# Patient Record
Sex: FEMALE | Race: WHITE | NOT HISPANIC OR LATINO | Employment: OTHER | URBAN - NONMETROPOLITAN AREA
[De-identification: names, ages, dates, MRNs, and addresses within clinical notes are randomized per-mention and may not be internally consistent; named-entity substitution may affect disease eponyms.]

---

## 2021-01-22 ENCOUNTER — LAB (OUTPATIENT)
Dept: LAB | Facility: HOSPITAL | Age: 46
End: 2021-01-22

## 2021-01-22 ENCOUNTER — OFFICE VISIT (OUTPATIENT)
Dept: FAMILY MEDICINE CLINIC | Facility: CLINIC | Age: 46
End: 2021-01-22

## 2021-01-22 VITALS
DIASTOLIC BLOOD PRESSURE: 88 MMHG | BODY MASS INDEX: 20.75 KG/M2 | SYSTOLIC BLOOD PRESSURE: 122 MMHG | HEIGHT: 72 IN | OXYGEN SATURATION: 96 % | HEART RATE: 83 BPM | WEIGHT: 153.2 LBS

## 2021-01-22 DIAGNOSIS — G89.29 CHRONIC PAIN OF LEFT KNEE: ICD-10-CM

## 2021-01-22 DIAGNOSIS — G89.29 CHRONIC LEFT SHOULDER PAIN: ICD-10-CM

## 2021-01-22 DIAGNOSIS — M25.562 CHRONIC PAIN OF LEFT KNEE: ICD-10-CM

## 2021-01-22 DIAGNOSIS — Z00.00 HEALTHCARE MAINTENANCE: Primary | ICD-10-CM

## 2021-01-22 DIAGNOSIS — I73.00 RAYNAUD'S PHENOMENON WITHOUT GANGRENE: ICD-10-CM

## 2021-01-22 DIAGNOSIS — M25.512 CHRONIC LEFT SHOULDER PAIN: ICD-10-CM

## 2021-01-22 DIAGNOSIS — Z00.00 HEALTHCARE MAINTENANCE: ICD-10-CM

## 2021-01-22 LAB
ALBUMIN SERPL-MCNC: 4.9 G/DL (ref 3.5–5.2)
ALBUMIN/GLOB SERPL: 1.7 G/DL
ALP SERPL-CCNC: 35 U/L (ref 39–117)
ALT SERPL W P-5'-P-CCNC: 16 U/L (ref 1–33)
ANION GAP SERPL CALCULATED.3IONS-SCNC: 14.3 MMOL/L (ref 5–15)
AST SERPL-CCNC: 22 U/L (ref 1–32)
BASOPHILS # BLD AUTO: 0.05 10*3/MM3 (ref 0–0.2)
BASOPHILS NFR BLD AUTO: 1 % (ref 0–1.5)
BILIRUB SERPL-MCNC: 0.6 MG/DL (ref 0–1.2)
BUN SERPL-MCNC: 6 MG/DL (ref 6–20)
BUN/CREAT SERPL: 8.2 (ref 7–25)
CALCIUM SPEC-SCNC: 9.8 MG/DL (ref 8.6–10.5)
CHLORIDE SERPL-SCNC: 100 MMOL/L (ref 98–107)
CHOLEST SERPL-MCNC: 190 MG/DL (ref 0–200)
CHROMATIN AB SERPL-ACNC: <10 IU/ML (ref 0–14)
CO2 SERPL-SCNC: 24.7 MMOL/L (ref 22–29)
CREAT SERPL-MCNC: 0.73 MG/DL (ref 0.57–1)
CRP SERPL-MCNC: <0.03 MG/DL (ref 0–0.5)
DEPRECATED RDW RBC AUTO: 44.8 FL (ref 37–54)
EOSINOPHIL # BLD AUTO: 0.07 10*3/MM3 (ref 0–0.4)
EOSINOPHIL NFR BLD AUTO: 1.4 % (ref 0.3–6.2)
ERYTHROCYTE [DISTWIDTH] IN BLOOD BY AUTOMATED COUNT: 15.5 % (ref 12.3–15.4)
ERYTHROCYTE [SEDIMENTATION RATE] IN BLOOD: 3 MM/HR (ref 0–20)
GFR SERPL CREATININE-BSD FRML MDRD: 86 ML/MIN/1.73
GLOBULIN UR ELPH-MCNC: 2.9 GM/DL
GLUCOSE SERPL-MCNC: 92 MG/DL (ref 65–99)
HCT VFR BLD AUTO: 38.5 % (ref 34–46.6)
HDLC SERPL-MCNC: 90 MG/DL (ref 40–60)
HGB BLD-MCNC: 12.1 G/DL (ref 12–15.9)
IMM GRANULOCYTES # BLD AUTO: 0.01 10*3/MM3 (ref 0–0.05)
IMM GRANULOCYTES NFR BLD AUTO: 0.2 % (ref 0–0.5)
LDLC SERPL CALC-MCNC: 90 MG/DL (ref 0–100)
LDLC/HDLC SERPL: 1 {RATIO}
LYMPHOCYTES # BLD AUTO: 1.48 10*3/MM3 (ref 0.7–3.1)
LYMPHOCYTES NFR BLD AUTO: 28.9 % (ref 19.6–45.3)
MCH RBC QN AUTO: 25.6 PG (ref 26.6–33)
MCHC RBC AUTO-ENTMCNC: 31.4 G/DL (ref 31.5–35.7)
MCV RBC AUTO: 81.6 FL (ref 79–97)
MONOCYTES # BLD AUTO: 0.41 10*3/MM3 (ref 0.1–0.9)
MONOCYTES NFR BLD AUTO: 8 % (ref 5–12)
NEUTROPHILS NFR BLD AUTO: 3.1 10*3/MM3 (ref 1.7–7)
NEUTROPHILS NFR BLD AUTO: 60.5 % (ref 42.7–76)
NRBC BLD AUTO-RTO: 0 /100 WBC (ref 0–0.2)
PLATELET # BLD AUTO: 310 10*3/MM3 (ref 140–450)
PMV BLD AUTO: 11.6 FL (ref 6–12)
POTASSIUM SERPL-SCNC: 3.9 MMOL/L (ref 3.5–5.2)
PROT SERPL-MCNC: 7.8 G/DL (ref 6–8.5)
RBC # BLD AUTO: 4.72 10*6/MM3 (ref 3.77–5.28)
SODIUM SERPL-SCNC: 139 MMOL/L (ref 136–145)
TRIGL SERPL-MCNC: 50 MG/DL (ref 0–150)
URATE SERPL-MCNC: 4.5 MG/DL (ref 2.4–5.7)
VLDLC SERPL-MCNC: 10 MG/DL (ref 5–40)
WBC # BLD AUTO: 5.12 10*3/MM3 (ref 3.4–10.8)

## 2021-01-22 PROCEDURE — 86140 C-REACTIVE PROTEIN: CPT

## 2021-01-22 PROCEDURE — 99203 OFFICE O/P NEW LOW 30 MIN: CPT | Performed by: STUDENT IN AN ORGANIZED HEALTH CARE EDUCATION/TRAINING PROGRAM

## 2021-01-22 PROCEDURE — 84550 ASSAY OF BLOOD/URIC ACID: CPT

## 2021-01-22 PROCEDURE — 80053 COMPREHEN METABOLIC PANEL: CPT

## 2021-01-22 PROCEDURE — 36415 COLL VENOUS BLD VENIPUNCTURE: CPT

## 2021-01-22 PROCEDURE — 85652 RBC SED RATE AUTOMATED: CPT

## 2021-01-22 PROCEDURE — 85025 COMPLETE CBC W/AUTO DIFF WBC: CPT

## 2021-01-22 PROCEDURE — 80061 LIPID PANEL: CPT

## 2021-01-22 PROCEDURE — 86431 RHEUMATOID FACTOR QUANT: CPT

## 2021-01-22 RX ORDER — CYCLOBENZAPRINE HCL 10 MG
10 TABLET ORAL DAILY
COMMUNITY
End: 2021-06-21 | Stop reason: SDUPTHER

## 2021-01-22 RX ORDER — ALPRAZOLAM 0.25 MG/1
0.5 TABLET ORAL AS NEEDED
COMMUNITY
End: 2021-06-22

## 2021-01-22 RX ORDER — AMLODIPINE BESYLATE 2.5 MG/1
2.5 TABLET ORAL AS NEEDED
COMMUNITY
End: 2021-12-03 | Stop reason: SDUPTHER

## 2021-01-22 RX ORDER — CELECOXIB 200 MG/1
200 CAPSULE ORAL DAILY
COMMUNITY
End: 2021-06-21 | Stop reason: SDUPTHER

## 2021-01-22 RX ORDER — FLUTICASONE PROPIONATE 50 MCG
SPRAY, SUSPENSION (ML) NASAL
COMMUNITY
End: 2021-06-21 | Stop reason: SDUPTHER

## 2021-01-22 RX ORDER — OLOPATADINE HYDROCHLORIDE 1 MG/ML
SOLUTION/ DROPS OPHTHALMIC
COMMUNITY
End: 2021-12-03 | Stop reason: SDUPTHER

## 2021-01-22 RX ORDER — ZOLPIDEM TARTRATE 5 MG/1
5 TABLET ORAL DAILY
COMMUNITY
Start: 2013-09-02 | End: 2021-06-22

## 2021-01-22 RX ORDER — SODIUM FLUORIDE 5 MG/G
GEL, DENTIFRICE DENTAL
COMMUNITY

## 2021-01-22 RX ORDER — ACETAMINOPHEN 325 MG/1
325 TABLET ORAL AS NEEDED
COMMUNITY

## 2021-03-09 ENCOUNTER — LAB (OUTPATIENT)
Dept: LAB | Facility: HOSPITAL | Age: 46
End: 2021-03-09

## 2021-03-09 ENCOUNTER — OFFICE VISIT (OUTPATIENT)
Dept: ORTHOPEDIC SURGERY | Facility: CLINIC | Age: 46
End: 2021-03-09

## 2021-03-09 VITALS — WEIGHT: 154 LBS | BODY MASS INDEX: 20.86 KG/M2 | HEIGHT: 72 IN

## 2021-03-09 DIAGNOSIS — M25.562 ACUTE PAIN OF LEFT KNEE: ICD-10-CM

## 2021-03-09 DIAGNOSIS — I10 ESSENTIAL HYPERTENSION: ICD-10-CM

## 2021-03-09 DIAGNOSIS — G89.29 CHRONIC LEFT SHOULDER PAIN: ICD-10-CM

## 2021-03-09 DIAGNOSIS — M25.312 INSTABILITY OF LEFT SHOULDER JOINT: Primary | ICD-10-CM

## 2021-03-09 DIAGNOSIS — M25.312 INSTABILITY OF LEFT SHOULDER JOINT: ICD-10-CM

## 2021-03-09 DIAGNOSIS — M25.512 CHRONIC LEFT SHOULDER PAIN: ICD-10-CM

## 2021-03-09 LAB — CRP SERPL-MCNC: <0.3 MG/DL (ref 0–0.5)

## 2021-03-09 PROCEDURE — 86140 C-REACTIVE PROTEIN: CPT

## 2021-03-09 PROCEDURE — 36415 COLL VENOUS BLD VENIPUNCTURE: CPT

## 2021-03-09 PROCEDURE — 85652 RBC SED RATE AUTOMATED: CPT

## 2021-03-09 PROCEDURE — 85025 COMPLETE CBC W/AUTO DIFF WBC: CPT

## 2021-03-09 PROCEDURE — 99204 OFFICE O/P NEW MOD 45 MIN: CPT | Performed by: ORTHOPAEDIC SURGERY

## 2021-03-09 NOTE — PROGRESS NOTES
Nancy Rivera is a 45 y.o. female   Primary provider:  Luis Stevenson MD       Chief Complaint   Patient presents with   • Left Shoulder - Initial Evaluation   • Left Knee - Initial Evaluation       HISTORY OF PRESENT ILLNESS:  Patient in for initial eval of left shoulder and left knee pain  Pain- 1/10  Xray on 01/22/2021  Patient states that she was an [employer].  She has had 3 prior surgeries involving her left shoulder.  She states that she initially had surgery in 2015 after a shoulder dislocation involving a SLAP tear.  She then had surgery in 2020 and then again in in November 2020 in which a biceps tenodesis screw was removed with the last surgery.  Her records are not currently available for review.  She reports that she is having left shoulder pain consisting of pain at night that wakes her up.  She has pain with activity and the pain is worse with activity.  She has numbness and tingling goes down her left arm.  She does take Celebrex for pain relief.  Her last MRI was May 2020.  She states that she feels a shift in her shoulder with certain activity.  She has been doing home exercise program.    She reports that her knee has no specific injury.  She states that she had a questionable bone bruise and a bone spur identified on MRI.  She has had Orthovisc injection but no recent MRI that she can recall.  She states that she tried physical therapy on her knee but that made her pain worse.        Knee Pain   Incident onset: 7 years. There was no injury mechanism. The pain is present in the left knee (and left shoulder). The quality of the pain is described as aching and stabbing. The pain is moderate. The pain has been constant since onset. Associated symptoms include numbness. Associated symptoms comments: Clicking/popping/snapping and swelling. She reports no foreign bodies present. Exacerbated by: standing, driving and walking. She has tried heat, ice and NSAIDs for the symptoms.        CONCURRENT  "MEDICAL HISTORY:    Past Medical History:   Diagnosis Date   • Allergic    • Anemia    • Anxiety    • Back pain        Allergies   Allergen Reactions   • Codeine Unknown - Low Severity         Current Outpatient Medications:   •  acetaminophen (Tylenol) 325 MG tablet, Take 325 mg by mouth As Needed., Disp: , Rfl:   •  ALPRAZolam (Xanax) 0.25 MG tablet, Take 0.5 mg by mouth As Needed., Disp: , Rfl:   •  amLODIPine (NORVASC) 2.5 MG tablet, Take 2.5 mg by mouth As Needed., Disp: , Rfl:   •  celecoxib (CeleBREX) 200 MG capsule, Take 200 mg by mouth Daily., Disp: , Rfl:   •  cyclobenzaprine (FLEXERIL) 10 MG tablet, Take 10 mg by mouth Daily., Disp: , Rfl:   •  fluticasone (Flonase Allergy Relief) 50 MCG/ACT nasal spray, , Disp: , Rfl:   •  olopatadine (PATANOL) 0.1 % ophthalmic solution, olopatadine 0.1 % eye drops, Disp: , Rfl:   •  Sodium Fluoride (PreviDent) 1.1 % gel, PreviDent 5000 Booster Plus 1.1 % dental paste, Disp: , Rfl:   •  triamcinolone (KENALOG) 0.1 % ointment, triamcinolone acetonide 0.1 % topical ointment, Disp: , Rfl:   •  zolpidem (Ambien) 5 MG tablet, Take 5 mg by mouth Daily., Disp: , Rfl:     Past Surgical History:   Procedure Laterality Date   • BICEPS TENODESIS Left    • SHOULDER ACROMIOPLASTY Right        No family history on file.    Social History     Socioeconomic History   • Marital status:      Spouse name: Not on file   • Number of children: Not on file   • Years of education: Not on file   • Highest education level: Not on file   Tobacco Use   • Smoking status: Current Every Day Smoker   • Smokeless tobacco: Never Used   Substance and Sexual Activity   • Alcohol use: Never   • Drug use: Never   • Sexual activity: Defer        Review of Systems   HENT: Positive for ear pain.    Eyes: Positive for visual disturbance.   Neurological: Positive for numbness and headaches.   All other systems reviewed and are negative.      PHYSICAL EXAMINATION:       Ht 182.9 cm (72\")   Wt 69.9 kg (154 " lb)   BMI 20.89 kg/m²     Physical Exam  Constitutional:       General: She is not in acute distress.     Appearance: Normal appearance.   Pulmonary:      Effort: Pulmonary effort is normal. No respiratory distress.   Neurological:      Mental Status: She is alert and oriented to person, place, and time.         GAIT:     []  Normal  []  Antalgic    Assistive device: [x]  None  []  Walker     []  Crutches  []  Cane     []  Wheelchair  []  Stretcher    Left Shoulder Exam     Tenderness   The patient is experiencing tenderness in the acromioclavicular joint and acromion.    Range of Motion   Active abduction: 120   External rotation: 90   Forward flexion: 130   Internal rotation 90 degrees: 50     Muscle Strength   Abduction: 4/5   Supraspinatus: 4/5     Other   Erythema: absent  Sensation: normal  Pulse: present     Comments:  Negative Rhoades Maurice test.  Negative sulcus sign.  Negative's load shift test.  Negative speeds test.  She has tenderness with apprehension test.                 Three view left shoulder     HISTORY: Left shoulder pain. Chronic pain.     AP films with the humerus in internal and external rotation and  scapular Y view were obtained.     COMPARISON: None     FINDINGS:   Minimal widening acromioclavicular joint.  No fracture or dislocation.  No other osseous or articular abnormality.     IMPRESSION:  CONCLUSION:  Minimal widening acromioclavicular joint.     [id number]     Electronically signed by:  Roderick Pro MD  1/22/2021 12:00 PM  CST Workstation: [id number]    Narrative & Impression      Four view left knee     HISTORY: Chronic left knee pain     AP, lateral, tunnel and oblique views obtained.     COMPARISON: None     FINDINGS:   No fracture or dislocation.  Incidental 21 mm x in length 4 mm x 3 mm exostosis arising  ventrally from the fibular head.  No suprapatellar effusion.  No other osseous or articular abnormality.     IMPRESSION:  CONCLUSION:  Incidental 21 mm in length x 4 mm x 3 mm  exostosis arising  ventrally from the fibular head.     [id number]     Electronically signed by:  Roderick Pro MD  1/22/2021 5:39 PM CST  Workstation: [id number]           ASSESSMENT:    Diagnoses and all orders for this visit:    Instability of left shoulder joint  -     CBC & Differential; Future  -     C-reactive Protein; Future  -     Sedimentation Rate; Future  -     MRI Shoulder Left Arthrogram; Future  -     FL Contrast Injection CT / MRI; Future    Acute pain of left knee  -     CBC & Differential; Future  -     C-reactive Protein; Future  -     Sedimentation Rate; Future    Chronic left shoulder pain  -     CBC & Differential; Future  -     C-reactive Protein; Future  -     Sedimentation Rate; Future  -     MRI Shoulder Left Arthrogram; Future  -     FL Contrast Injection CT / MRI; Future    Essential hypertension          PLAN    Long discussion was had with the patient regarding further treatment options.  Her last MRI was prior to her last surgery.  She continues to have pain in her left shoulder and reports some instability type symptoms although her special testing today is essentially negative.  She has constant dull aches we therefore discussed proceeding with CBC, CRP, and ESR to assess for low-grade infection.  We also discussed proceeding with MR arthrogram to assess for changes in the superior labral repair.    She is going to continue to work on obtaining the records from prior surgeries and prior MRIs for further review.    She has previously been taking Celebrex and will continue with that anti-inflammatory treatment.    Over 45 minutes was spent with the patient in regards to evaluation, history, treatment options, and reviewing the available records/x-rays/reports.    Return for recheck for MRI results.    Harley Walter MD

## 2021-03-10 LAB
BASOPHILS # BLD AUTO: 0.07 10*3/MM3 (ref 0–0.2)
BASOPHILS NFR BLD AUTO: 0.9 % (ref 0–1.5)
DEPRECATED RDW RBC AUTO: 50.6 FL (ref 37–54)
EOSINOPHIL # BLD AUTO: 0.15 10*3/MM3 (ref 0–0.4)
EOSINOPHIL NFR BLD AUTO: 1.9 % (ref 0.3–6.2)
ERYTHROCYTE [DISTWIDTH] IN BLOOD BY AUTOMATED COUNT: 15.7 % (ref 12.3–15.4)
ERYTHROCYTE [SEDIMENTATION RATE] IN BLOOD: 13 MM/HR (ref 0–20)
HCT VFR BLD AUTO: 41.1 % (ref 34–46.6)
HGB BLD-MCNC: 13 G/DL (ref 12–15.9)
IMM GRANULOCYTES # BLD AUTO: 0.02 10*3/MM3 (ref 0–0.05)
IMM GRANULOCYTES NFR BLD AUTO: 0.3 % (ref 0–0.5)
LYMPHOCYTES # BLD AUTO: 1.64 10*3/MM3 (ref 0.7–3.1)
LYMPHOCYTES NFR BLD AUTO: 21.2 % (ref 19.6–45.3)
MCH RBC QN AUTO: 27.6 PG (ref 26.6–33)
MCHC RBC AUTO-ENTMCNC: 31.6 G/DL (ref 31.5–35.7)
MCV RBC AUTO: 87.3 FL (ref 79–97)
MONOCYTES # BLD AUTO: 0.6 10*3/MM3 (ref 0.1–0.9)
MONOCYTES NFR BLD AUTO: 7.8 % (ref 5–12)
NEUTROPHILS NFR BLD AUTO: 5.24 10*3/MM3 (ref 1.7–7)
NEUTROPHILS NFR BLD AUTO: 67.9 % (ref 42.7–76)
NRBC BLD AUTO-RTO: 0 /100 WBC (ref 0–0.2)
PLATELET # BLD AUTO: 291 10*3/MM3 (ref 140–450)
PMV BLD AUTO: 11.3 FL (ref 6–12)
RBC # BLD AUTO: 4.71 10*6/MM3 (ref 3.77–5.28)
WBC # BLD AUTO: 7.72 10*3/MM3 (ref 3.4–10.8)

## 2021-03-22 ENCOUNTER — HOSPITAL ENCOUNTER (OUTPATIENT)
Dept: MRI IMAGING | Facility: HOSPITAL | Age: 46
Discharge: HOME | End: 2021-03-22

## 2021-03-22 ENCOUNTER — HOSPITAL ENCOUNTER (OUTPATIENT)
Dept: GENERAL RADIOLOGY | Facility: HOSPITAL | Age: 46
Discharge: HOME | End: 2021-03-22

## 2021-03-22 VITALS
TEMPERATURE: 98.6 F | SYSTOLIC BLOOD PRESSURE: 142 MMHG | HEART RATE: 88 BPM | OXYGEN SATURATION: 98 % | DIASTOLIC BLOOD PRESSURE: 96 MMHG

## 2021-03-22 DIAGNOSIS — M25.512 CHRONIC LEFT SHOULDER PAIN: ICD-10-CM

## 2021-03-22 DIAGNOSIS — G89.29 CHRONIC LEFT SHOULDER PAIN: ICD-10-CM

## 2021-03-22 DIAGNOSIS — M25.312 INSTABILITY OF LEFT SHOULDER JOINT: ICD-10-CM

## 2021-03-22 PROCEDURE — A9579 GAD-BASE MR CONTRAST NOS,1ML: HCPCS | Performed by: ORTHOPAEDIC SURGERY

## 2021-03-22 PROCEDURE — 77002 NEEDLE LOCALIZATION BY XRAY: CPT

## 2021-03-22 PROCEDURE — 73222 MRI JOINT UPR EXTREM W/DYE: CPT

## 2021-03-22 PROCEDURE — 25010000002 GADOTERIDOL PER 1 ML: Performed by: ORTHOPAEDIC SURGERY

## 2021-03-22 PROCEDURE — 25010000002 IOPAMIDOL 61 % SOLUTION: Performed by: ORTHOPAEDIC SURGERY

## 2021-03-22 RX ADMIN — GADOTERIDOL 1 ML: 279.3 INJECTION, SOLUTION INTRAVENOUS at 14:03

## 2021-03-22 RX ADMIN — IOPAMIDOL 5 ML: 612 INJECTION, SOLUTION INTRAVENOUS at 13:47

## 2021-03-22 NOTE — POST-PROCEDURE NOTE
Pre-Op Diagnosis:  Left shoulder pain  Post-Op Diagnosis: Left shoulder pain  Procedure: Left shoulder arthrogram  Anesthesia: local  EBL: none  Specimens:   Not sent to LAB / PATH  Findings: No Specimen sent, awaiting results  Complications: none  Disposition:  Patient tolerated the procedure well

## 2021-03-22 NOTE — PRE-PROCEDURE NOTE
Patient for left shoulder arthrogram pre-MRI.Procedure, risks , and benefits discussed with patient and patient  gave informed consent.  H&P dated 3/09/2021 reviewed with no changes.

## 2021-06-21 ENCOUNTER — OFFICE VISIT (OUTPATIENT)
Dept: FAMILY MEDICINE CLINIC | Facility: CLINIC | Age: 46
End: 2021-06-21

## 2021-06-21 VITALS
SYSTOLIC BLOOD PRESSURE: 132 MMHG | HEIGHT: 72 IN | WEIGHT: 154.8 LBS | BODY MASS INDEX: 20.97 KG/M2 | OXYGEN SATURATION: 98 % | HEART RATE: 75 BPM | DIASTOLIC BLOOD PRESSURE: 88 MMHG

## 2021-06-21 DIAGNOSIS — M25.572 ACUTE LEFT ANKLE PAIN: ICD-10-CM

## 2021-06-21 DIAGNOSIS — M54.50 ACUTE RIGHT-SIDED LOW BACK PAIN WITHOUT SCIATICA: Primary | ICD-10-CM

## 2021-06-21 DIAGNOSIS — J30.2 SEASONAL ALLERGIES: ICD-10-CM

## 2021-06-21 PROCEDURE — 99213 OFFICE O/P EST LOW 20 MIN: CPT | Performed by: STUDENT IN AN ORGANIZED HEALTH CARE EDUCATION/TRAINING PROGRAM

## 2021-06-21 RX ORDER — FLUTICASONE PROPIONATE 50 MCG
2 SPRAY, SUSPENSION (ML) NASAL DAILY
Qty: 16 G | Refills: 2 | Status: SHIPPED | OUTPATIENT
Start: 2021-06-21 | End: 2021-10-18 | Stop reason: SDUPTHER

## 2021-06-21 RX ORDER — PREDNISONE 20 MG/1
40 TABLET ORAL DAILY
Qty: 14 TABLET | Refills: 0 | Status: SHIPPED | OUTPATIENT
Start: 2021-06-21 | End: 2021-06-28

## 2021-06-21 RX ORDER — OLOPATADINE HYDROCHLORIDE 1 MG/ML
SOLUTION/ DROPS OPHTHALMIC
Status: CANCELLED | OUTPATIENT
Start: 2021-06-21

## 2021-06-21 RX ORDER — FEXOFENADINE HCL 180 MG/1
180 TABLET ORAL DAILY
COMMUNITY

## 2021-06-21 RX ORDER — CELECOXIB 200 MG/1
200 CAPSULE ORAL DAILY
Qty: 30 CAPSULE | Refills: 2 | Status: SHIPPED | OUTPATIENT
Start: 2021-06-21 | End: 2021-09-15 | Stop reason: SDUPTHER

## 2021-06-21 RX ORDER — CYCLOBENZAPRINE HCL 10 MG
10 TABLET ORAL DAILY
Qty: 60 TABLET | Refills: 0 | Status: SHIPPED | OUTPATIENT
Start: 2021-06-21

## 2021-06-21 NOTE — PROGRESS NOTES
Subjective   Nancy Rivera is a 46 y.o. female who presents for initial evaluation  for left ankle and left hip pain States ankle pain has been present for 3 weeks, does not remember any inciting event. Currently using orthotics that give some relief. Also complaining of left sided hip pain that has been present for 2-3 months. Pain is exacerbated by movement.       Past Medical Hx:  Past Medical History:   Diagnosis Date   • Allergic    • Anemia    • Anxiety    • Back pain        Past Surgical Hx:  Past Surgical History:   Procedure Laterality Date   • BICEPS TENODESIS Left    • SHOULDER ACROMIOPLASTY Right        Health Maintenance:  Health Maintenance   Topic Date Due   • COLORECTAL CANCER SCREENING  Never done   • ANNUAL PHYSICAL  Never done   • Pneumococcal Vaccine 0-64 (1 of 1 - PPSV23) Never done   • COVID-19 Vaccine (1) Never done   • TDAP/TD VACCINES (1 - Tdap) Never done   • HEPATITIS C SCREENING  Never done   • PAP SMEAR  Never done   • INFLUENZA VACCINE  08/01/2021       Current Meds:    Current Outpatient Medications:   •  acetaminophen (Tylenol) 325 MG tablet, Take 325 mg by mouth As Needed., Disp: , Rfl:   •  ALPRAZolam (Xanax) 0.25 MG tablet, Take 0.5 mg by mouth As Needed., Disp: , Rfl:   •  amLODIPine (NORVASC) 2.5 MG tablet, Take 2.5 mg by mouth As Needed., Disp: , Rfl:   •  celecoxib (CeleBREX) 200 MG capsule, Take 1 capsule by mouth Daily., Disp: 30 capsule, Rfl: 2  •  cyclobenzaprine (FLEXERIL) 10 MG tablet, Take 1 tablet by mouth Daily., Disp: 60 tablet, Rfl: 0  •  fexofenadine (ALLEGRA) 180 MG tablet, Take 180 mg by mouth Daily., Disp: , Rfl:   •  fluticasone (Flonase Allergy Relief) 50 MCG/ACT nasal spray, 2 sprays into the nostril(s) as directed by provider Daily., Disp: 16 g, Rfl: 2  •  olopatadine (PATANOL) 0.1 % ophthalmic solution, olopatadine 0.1 % eye drops, Disp: , Rfl:   •  Sodium Fluoride (PreviDent) 1.1 % gel, PreviDent 5000 Booster Plus 1.1 % dental paste,  "Disp: , Rfl:   •  triamcinolone (KENALOG) 0.1 % ointment, Apply  topically to the appropriate area as directed 2 (Two) Times a Day., Disp: 80 g, Rfl: 2  •  zolpidem (Ambien) 5 MG tablet, Take 5 mg by mouth Daily., Disp: , Rfl:   •  predniSONE (DELTASONE) 20 MG tablet, Take 2 tablets by mouth Daily for 7 days., Disp: 14 tablet, Rfl: 0    Allergies:  Codeine    Family Hx:  History reviewed. No pertinent family history.     Social History:  Social History     Socioeconomic History   • Marital status:      Spouse name: Not on file   • Number of children: Not on file   • Years of education: Not on file   • Highest education level: Not on file   Tobacco Use   • Smoking status: Current Every Day Smoker   • Smokeless tobacco: Never Used   Substance and Sexual Activity   • Alcohol use: Never   • Drug use: Never   • Sexual activity: Defer       Review of Systems  Review of Systems   Constitutional: Negative for activity change, chills, diaphoresis and fever.   HENT: Negative for dental problem, drooling, ear discharge, ear pain, facial swelling, mouth sores, nosebleeds, rhinorrhea, sinus pressure, sinus pain, sneezing and sore throat.    Eyes: Negative for pain, discharge and visual disturbance.   Respiratory: Negative for apnea, cough, shortness of breath, wheezing and stridor.    Cardiovascular: Negative for chest pain, palpitations and leg swelling.   Gastrointestinal: Negative for abdominal distention, abdominal pain, constipation, diarrhea, nausea and vomiting.   Genitourinary: Negative for dysuria, frequency and urgency.   Musculoskeletal: Positive for arthralgias and back pain.   Skin: Negative for rash and wound.   Neurological: Negative for dizziness, facial asymmetry, light-headedness and headaches.   Psychiatric/Behavioral: Negative for agitation and decreased concentration. The patient is not nervous/anxious.             Objective:     /88   Pulse 75   Ht 182.9 cm (72\")   Wt 70.2 kg (154 lb 12.8 " oz)   SpO2 98%   BMI 20.99 kg/m²       Physical Exam  Constitutional:       Appearance: She is well-developed.   HENT:      Head: Normocephalic and atraumatic.      Right Ear: External ear normal.      Left Ear: External ear normal.      Nose: Nose normal.      Mouth/Throat:      Pharynx: No oropharyngeal exudate.   Eyes:      General: No scleral icterus.        Right eye: No discharge.         Left eye: No discharge.      Conjunctiva/sclera: Conjunctivae normal.      Pupils: Pupils are equal, round, and reactive to light.   Neck:      Vascular: No JVD.   Cardiovascular:      Rate and Rhythm: Normal rate and regular rhythm.      Heart sounds: Normal heart sounds. No murmur heard.   No friction rub. No gallop.    Pulmonary:      Effort: Pulmonary effort is normal. No respiratory distress.      Breath sounds: Normal breath sounds. No stridor. No wheezing or rales.   Abdominal:      General: Bowel sounds are normal. There is no distension.      Palpations: Abdomen is soft.      Tenderness: There is no abdominal tenderness.   Musculoskeletal:         General: No deformity. Tenderness: left posterior ankle. Normal range of motion.      Cervical back: Normal range of motion and neck supple.   Skin:     General: Skin is warm and dry.      Capillary Refill: Capillary refill takes less than 2 seconds.      Coloration: Skin is not pale.      Findings: No erythema or rash.   Neurological:      Mental Status: She is alert and oriented to person, place, and time.   Psychiatric:         Behavior: Behavior normal.         Assessment/Plan:     Diagnoses and all orders for this visit:    1. Acute right-sided low back pain without sciatica (Primary)  -     celecoxib (CeleBREX) 200 MG capsule; Take 1 capsule by mouth Daily.  Dispense: 30 capsule; Refill: 2  -     cyclobenzaprine (FLEXERIL) 10 MG tablet; Take 1 tablet by mouth Daily.  Dispense: 60 tablet; Refill: 0  -     predniSONE (DELTASONE) 20 MG tablet; Take 2 tablets by mouth  Daily for 7 days.  Dispense: 14 tablet; Refill: 0    2. Seasonal allergies  -     fluticasone (Flonase Allergy Relief) 50 MCG/ACT nasal spray; 2 sprays into the nostril(s) as directed by provider Daily.  Dispense: 16 g; Refill: 2    3. Acute left ankle pain  -     predniSONE (DELTASONE) 20 MG tablet; Take 2 tablets by mouth Daily for 7 days.  Dispense: 14 tablet; Refill: 0    Other orders  -     Cancel: olopatadine (PATANOL) 0.1 % ophthalmic solution  -     triamcinolone (KENALOG) 0.1 % ointment; Apply  topically to the appropriate area as directed 2 (Two) Times a Day.  Dispense: 80 g; Refill: 2    Will prescribe Prednisone for anti-inflammatory effcect as she chronically uses Celebrex without relief. Will also prescribe Flexeril secondary to back spasms. Refill Flonase for seasonal allergies.      Follow-up:     Return in about 3 months (around 9/21/2021).      Preventative:    Vaccines Recommended at this visit:   No Vaccines recommended today. Patient is up to date on all vaccines.     Vaccines Received at this visit:  No Vaccines recommended today. Patient is up to date on all vaccines.     Screenings Recommended at this visit:  No Screenings offered today. Patient is up to date on all screenings at this time.     Screenings Ordered at this visit:  No screenings were offered today. Patient is up to date on all screenings.     Smoking Status:  Patient is current smoker. Patient is not interested in smoking cessation.    Alcohol Intake:  Patient does not drink    Patient's Body mass index is 20.99 kg/m².      RISK SCORE: 3          This document has been electronically signed by Luis Stevenson MD on June 21, 2021 16:45 CDT

## 2021-06-22 NOTE — PROGRESS NOTES
I have reviewed the notes, assessments, and/or procedures performed by Dr. Jasmina Potter, I concur with her  documentation and assessment and plan for Nancyariane MujicaRivera        This document has been electronically signed by Humberto Alex MD on June 22, 2021 09:47 CDT

## 2021-07-23 ENCOUNTER — TELEPHONE (OUTPATIENT)
Dept: FAMILY MEDICINE CLINIC | Facility: CLINIC | Age: 46
End: 2021-07-23

## 2021-08-18 ENCOUNTER — OFFICE VISIT (OUTPATIENT)
Dept: FAMILY MEDICINE CLINIC | Facility: CLINIC | Age: 46
End: 2021-08-18

## 2021-08-18 VITALS
DIASTOLIC BLOOD PRESSURE: 80 MMHG | BODY MASS INDEX: 21.1 KG/M2 | WEIGHT: 155.8 LBS | HEIGHT: 72 IN | SYSTOLIC BLOOD PRESSURE: 128 MMHG | HEART RATE: 97 BPM | OXYGEN SATURATION: 99 %

## 2021-08-18 DIAGNOSIS — M54.2 CHRONIC NECK PAIN: ICD-10-CM

## 2021-08-18 DIAGNOSIS — M54.50 CHRONIC BILATERAL LOW BACK PAIN WITHOUT SCIATICA: Primary | ICD-10-CM

## 2021-08-18 DIAGNOSIS — G89.29 CHRONIC BILATERAL LOW BACK PAIN WITHOUT SCIATICA: Primary | ICD-10-CM

## 2021-08-18 DIAGNOSIS — G89.29 MID BACK PAIN, CHRONIC: ICD-10-CM

## 2021-08-18 DIAGNOSIS — G89.29 CHRONIC NECK PAIN: ICD-10-CM

## 2021-08-18 DIAGNOSIS — M54.9 MID BACK PAIN, CHRONIC: ICD-10-CM

## 2021-08-18 PROCEDURE — 99213 OFFICE O/P EST LOW 20 MIN: CPT | Performed by: CHIROPRACTOR

## 2021-08-18 RX ORDER — ALPRAZOLAM 0.25 MG/1
0.25 TABLET ORAL DAILY PRN
COMMUNITY
Start: 2021-08-02

## 2021-08-18 RX ORDER — ZOLPIDEM TARTRATE 5 MG/1
5 TABLET ORAL NIGHTLY PRN
COMMUNITY
Start: 2021-08-02

## 2021-08-19 NOTE — PROGRESS NOTES
Family Medicine Residency  Harley Mcgowan MD    Subjective:     Nancy Rivera is a 46 y.o. female who presents for mid back pain, low back pain, and neck pain.  She reports she has had mid back pain since about 2011 as a result of carrying around a 75 pound rucksack while in the [employer].  The neck pain onset about 5 years ago insidiously.  She perceived reduced ability to turn her neck to either side especially left.  She has numbness and tingling in her hands bilaterally.  She does have an appointment later this month with Dr. Walter for evaluation of left knee pain.  In the past she has tried physical therapy with minimal results.  She has tried chiropractic care in the past with very good results.  She cannot recall ever having had an x-ray of her spine.    The following portions of the patient's history were reviewed and updated as appropriate: allergies, current medications, past family history, past medical history, past social history, past surgical history and problem list.    Past Medical Hx:  Past Medical History:   Diagnosis Date   • Allergic    • Anemia    • Anxiety    • Back pain    • Low back pain 2016       Past Surgical Hx:  Past Surgical History:   Procedure Laterality Date   • BICEPS TENODESIS Left    • SHOULDER ACROMIOPLASTY Right        Current Meds:    Current Outpatient Medications:   •  acetaminophen (Tylenol) 325 MG tablet, Take 325 mg by mouth As Needed., Disp: , Rfl:   •  ALPRAZolam (XANAX) 0.25 MG tablet, Take 0.25 mg by mouth Daily As Needed., Disp: , Rfl:   •  amLODIPine (NORVASC) 2.5 MG tablet, Take 2.5 mg by mouth As Needed., Disp: , Rfl:   •  celecoxib (CeleBREX) 200 MG capsule, Take 1 capsule by mouth Daily., Disp: 30 capsule, Rfl: 2  •  cyclobenzaprine (FLEXERIL) 10 MG tablet, Take 1 tablet by mouth Daily., Disp: 60 tablet, Rfl: 0  •  fexofenadine (ALLEGRA) 180 MG tablet, Take 180 mg by mouth Daily., Disp: , Rfl:   •  fluticasone (Flonase Allergy Relief) 50 MCG/ACT nasal spray, 2  "sprays into the nostril(s) as directed by provider Daily., Disp: 16 g, Rfl: 2  •  olopatadine (PATANOL) 0.1 % ophthalmic solution, olopatadine 0.1 % eye drops, Disp: , Rfl:   •  Sodium Fluoride (PreviDent) 1.1 % gel, PreviDent 5000 Booster Plus 1.1 % dental paste, Disp: , Rfl:   •  triamcinolone (KENALOG) 0.1 % ointment, Apply  topically to the appropriate area as directed 2 (Two) Times a Day., Disp: 80 g, Rfl: 2  •  zolpidem (AMBIEN) 5 MG tablet, Take 5 mg by mouth At Night As Needed., Disp: , Rfl:     Allergies:  Allergies   Allergen Reactions   • Codeine Unknown - Low Severity       Family Hx:  History reviewed. No pertinent family history.     Social History:  Social History     Socioeconomic History   • Marital status:      Spouse name: Not on file   • Number of children: Not on file   • Years of education: Not on file   • Highest education level: Not on file   Tobacco Use   • Smoking status: Current Every Day Smoker   • Smokeless tobacco: Never Used   Substance and Sexual Activity   • Alcohol use: Never   • Drug use: Never   • Sexual activity: Defer       Review of Systems  Review of Systems   Constitutional: Negative for fever.   HENT: Negative for trouble swallowing.    Eyes: Positive for visual disturbance.        Patient reports decreased ability to see at night.   Respiratory: Positive for choking.         Has been a smoker for 30 years 1 pack/day.    Reports occasional sensation of choking.   Cardiovascular: Negative for chest pain.   Gastrointestinal: Negative for abdominal pain.   Genitourinary: Positive for pelvic pain. Negative for dysuria.   Musculoskeletal: Positive for back pain.   Neurological: Positive for weakness, numbness and headaches.   Psychiatric/Behavioral: The patient is nervous/anxious.        Objective:     /80   Pulse 97   Ht 182.9 cm (72\")   Wt 70.7 kg (155 lb 12.8 oz)   SpO2 99%   BMI 21.13 kg/m²   Physical Exam  Constitutional:       General: She is not in acute " distress.     Appearance: Normal appearance. She is not ill-appearing or diaphoretic.   HENT:      Head: Normocephalic and atraumatic.      Right Ear: External ear normal.      Left Ear: External ear normal.      Nose: Nose normal.      Mouth/Throat:      Mouth: Mucous membranes are moist.      Pharynx: No posterior oropharyngeal erythema.   Eyes:      General: No scleral icterus.        Right eye: No discharge.         Left eye: No discharge.      Extraocular Movements: Extraocular movements intact.   Neck:      Vascular: No carotid bruit.   Cardiovascular:      Rate and Rhythm: Normal rate and regular rhythm.      Pulses: Normal pulses.      Heart sounds: Normal heart sounds. No murmur heard.     Pulmonary:      Effort: Pulmonary effort is normal.      Breath sounds: Normal breath sounds. No wheezing or rhonchi.   Chest:      Chest wall: No tenderness.   Abdominal:      General: There is no distension.      Palpations: Abdomen is soft. There is no mass.      Tenderness: There is no abdominal tenderness.   Musculoskeletal:         General: Tenderness present. No deformity or signs of injury.      Cervical back: No rigidity. No muscular tenderness.      Right lower leg: No edema.      Left lower leg: No edema.      Comments: Significant paraspinal tenderness present upper thoracic spine.  Cervical rotation decreased to 45 degrees left side 50 degrees right side.  Foraminal compression test positive left side.  Lesser tenderness evident lower lumbar region midline.   Lymphadenopathy:      Cervical: No cervical adenopathy.   Skin:     Capillary Refill: Capillary refill takes less than 2 seconds.      Findings: No lesion or rash.   Neurological:      General: No focal deficit present.      Mental Status: She is alert and oriented to person, place, and time.      Cranial Nerves: No cranial nerve deficit.      Sensory: No sensory deficit.      Motor: No weakness.   Psychiatric:         Mood and Affect: Mood normal.          Thought Content: Thought content normal.          Assessment/Plan:     Diagnoses and all orders for this visit:    1. Chronic bilateral low back pain without sciatica (Primary)  -Pain onset during  duty  -Physical therapy in the past resulted in little perceived improvement  -Chiropractic care in the past resulted in very significant improvement  -We will facilitate referral to chiropractic care.    2. Mid back pain, chronic  -Pain onset during  duty  -Physical therapy in the past resulted in little perceived improvement  -Chiropractic care in the past resulted in very significant improvement  -We will facilitate referral to chiropractic care.    3. Chronic neck pain  -Neck pain seems to be increased in the last 5 years.  -Cervical rotation decreased approximately 50% bilaterally  -Evidence of brachial neuritis into hands and fingers bilaterally      · Rx changes: None  · Patient Education: Importance of keeping follow-up appointments  · Compliance at present is estimated to be excellent.   · Efforts to improve compliance (if necessary) will be directed at Coordination with other providers.       Follow-up:     Return in about 6 months (around 2/18/2022) for Recheck.    Preventative:  Health Maintenance   Topic Date Due   • COLORECTAL CANCER SCREENING  Never done   • ANNUAL PHYSICAL  Never done   • Pneumococcal Vaccine 0-64 (1 of 2 - PPSV23) Never done   • COVID-19 Vaccine (1) Never done   • TDAP/TD VACCINES (1 - Tdap) Never done   • HEPATITIS C SCREENING  Never done   • PAP SMEAR  Never done   • INFLUENZA VACCINE  10/01/2021       Weight  -Class: Normal: 18.5-24.9kg/m2  -Patient's Body mass index is 21.13 kg/m². indicating that she is within normal range (BMI 18.5-24.9). No BMI management plan needed..   have 3 meals a day    Alcohol use:  reports no history of alcohol use.  Nicotine status  reports that she has been smoking. She has never used smokeless tobacco.    Goals    None         RISK  SCORE: 2        This document has been electronically signed by Harley Mcgowan MD on August 19, 2021 11:33 CDT        Answers for HPI/ROS submitted by the patient on 8/11/2021  What is the primary reason for your visit?: Back Pain  Chronicity: chronic  Onset: more than 1 year ago  Frequency: constantly  Progression since onset: gradually worsening  Pain location: lumbar spine, sacro-iliac, thoracic spine  Pain quality: aching, shooting, stabbing  Radiates to: left thigh, right thigh  Pain - numeric: 8/10  Pain is: the same all the time  Aggravated by: bending, position, lying down, sitting, standing  Stiffness is present: all day  bladder incontinence: No  bowel incontinence: No  leg pain: Yes  paresis: No  paresthesias: Yes  perianal numbness: No  tingling: Yes  weight loss: No  Risk factors: lack of exercise, poor posture, sedentary lifestyle

## 2021-08-23 NOTE — PROGRESS NOTES
I have helped formulate and discussed the assessment and plan with Dr.James Mcgowan.    I have spoken with the patient .   I have reviewed the notes, assessments, and/or procedures performed by Dr. Harley Mcgowan, I concur with his  documentation and assessment and plan for Nancy Rivera.          This document has been electronically signed by Humberto Alex MD on August 23, 2021 08:57 CDT            Answers for HPI/ROS submitted by the patient on 8/11/2021  What is the primary reason for your visit?: Back Pain  Chronicity: chronic  Onset: more than 1 year ago  Frequency: constantly  Progression since onset: gradually worsening  Pain location: lumbar spine, sacro-iliac, thoracic spine  Pain quality: aching, shooting, stabbing  Radiates to: left thigh, right thigh  Pain - numeric: 8/10  Pain is: the same all the time  Aggravated by: bending, position, lying down, sitting, standing  Stiffness is present: all day  abdominal pain: No  bladder incontinence: No  bowel incontinence: No  chest pain: No  dysuria: No  fever: No  headaches: Yes  leg pain: Yes  numbness: Yes  paresis: No  paresthesias: Yes  pelvic pain: Yes  perianal numbness: No  tingling: Yes  weakness: Yes  weight loss: No  Risk factors: lack of exercise, poor posture, sedentary lifestyle

## 2021-09-15 DIAGNOSIS — M54.50 ACUTE RIGHT-SIDED LOW BACK PAIN WITHOUT SCIATICA: ICD-10-CM

## 2021-09-16 RX ORDER — CELECOXIB 200 MG/1
200 CAPSULE ORAL DAILY
Qty: 30 CAPSULE | Refills: 2 | Status: SHIPPED | OUTPATIENT
Start: 2021-09-16

## 2021-09-30 ENCOUNTER — OFFICE VISIT (OUTPATIENT)
Dept: ORTHOPEDIC SURGERY | Facility: CLINIC | Age: 46
End: 2021-09-30

## 2021-09-30 ENCOUNTER — LAB (OUTPATIENT)
Dept: LAB | Facility: HOSPITAL | Age: 46
End: 2021-09-30

## 2021-09-30 VITALS — WEIGHT: 154 LBS | BODY MASS INDEX: 20.86 KG/M2 | HEIGHT: 72 IN

## 2021-09-30 DIAGNOSIS — I10 ESSENTIAL HYPERTENSION: ICD-10-CM

## 2021-09-30 DIAGNOSIS — M25.562 ACUTE PAIN OF LEFT KNEE: ICD-10-CM

## 2021-09-30 DIAGNOSIS — M23.92 INTERNAL DERANGEMENT OF LEFT KNEE: Primary | ICD-10-CM

## 2021-09-30 DIAGNOSIS — Z82.61 FAMILY HISTORY OF RHEUMATOID ARTHRITIS: ICD-10-CM

## 2021-09-30 DIAGNOSIS — M23.92 INTERNAL DERANGEMENT OF LEFT KNEE: ICD-10-CM

## 2021-09-30 LAB
BASOPHILS # BLD AUTO: 0.04 10*3/MM3 (ref 0–0.2)
BASOPHILS NFR BLD AUTO: 0.9 % (ref 0–1.5)
CHROMATIN AB SERPL-ACNC: 10.8 IU/ML (ref 0–14)
CRP SERPL-MCNC: <0.3 MG/DL (ref 0–0.5)
DEPRECATED RDW RBC AUTO: 50.7 FL (ref 37–54)
EOSINOPHIL # BLD AUTO: 0.07 10*3/MM3 (ref 0–0.4)
EOSINOPHIL NFR BLD AUTO: 1.5 % (ref 0.3–6.2)
ERYTHROCYTE [DISTWIDTH] IN BLOOD BY AUTOMATED COUNT: 14.5 % (ref 12.3–15.4)
ERYTHROCYTE [SEDIMENTATION RATE] IN BLOOD: 3 MM/HR (ref 0–20)
HCT VFR BLD AUTO: 44.7 % (ref 34–46.6)
HGB BLD-MCNC: 15.3 G/DL (ref 12–15.9)
IMM GRANULOCYTES # BLD AUTO: 0.02 10*3/MM3 (ref 0–0.05)
IMM GRANULOCYTES NFR BLD AUTO: 0.4 % (ref 0–0.5)
LYMPHOCYTES # BLD AUTO: 0.84 10*3/MM3 (ref 0.7–3.1)
LYMPHOCYTES NFR BLD AUTO: 18.1 % (ref 19.6–45.3)
MCH RBC QN AUTO: 32.3 PG (ref 26.6–33)
MCHC RBC AUTO-ENTMCNC: 34.2 G/DL (ref 31.5–35.7)
MCV RBC AUTO: 94.5 FL (ref 79–97)
MONOCYTES # BLD AUTO: 0.46 10*3/MM3 (ref 0.1–0.9)
MONOCYTES NFR BLD AUTO: 9.9 % (ref 5–12)
NEUTROPHILS NFR BLD AUTO: 3.22 10*3/MM3 (ref 1.7–7)
NEUTROPHILS NFR BLD AUTO: 69.2 % (ref 42.7–76)
NRBC BLD AUTO-RTO: 0 /100 WBC (ref 0–0.2)
PLATELET # BLD AUTO: 182 10*3/MM3 (ref 140–450)
PMV BLD AUTO: 11.3 FL (ref 6–12)
RBC # BLD AUTO: 4.73 10*6/MM3 (ref 3.77–5.28)
WBC # BLD AUTO: 4.65 10*3/MM3 (ref 3.4–10.8)

## 2021-09-30 PROCEDURE — 81374 HLA I TYPING 1 ANTIGEN LR: CPT

## 2021-09-30 PROCEDURE — 86431 RHEUMATOID FACTOR QUANT: CPT

## 2021-09-30 PROCEDURE — 36415 COLL VENOUS BLD VENIPUNCTURE: CPT

## 2021-09-30 PROCEDURE — 85025 COMPLETE CBC W/AUTO DIFF WBC: CPT

## 2021-09-30 PROCEDURE — 99213 OFFICE O/P EST LOW 20 MIN: CPT | Performed by: ORTHOPAEDIC SURGERY

## 2021-09-30 PROCEDURE — 86140 C-REACTIVE PROTEIN: CPT

## 2021-09-30 PROCEDURE — 85652 RBC SED RATE AUTOMATED: CPT

## 2021-09-30 PROCEDURE — 86038 ANTINUCLEAR ANTIBODIES: CPT

## 2021-10-01 LAB — ANA TITR SER IF: NEGATIVE {TITER}

## 2021-10-05 ENCOUNTER — HOSPITAL ENCOUNTER (OUTPATIENT)
Dept: MRI IMAGING | Facility: HOSPITAL | Age: 46
Discharge: HOME | End: 2021-10-05
Admitting: ORTHOPAEDIC SURGERY

## 2021-10-05 DIAGNOSIS — M25.562 ACUTE PAIN OF LEFT KNEE: ICD-10-CM

## 2021-10-05 DIAGNOSIS — M23.92 INTERNAL DERANGEMENT OF LEFT KNEE: ICD-10-CM

## 2021-10-05 PROCEDURE — 73721 MRI JNT OF LWR EXTRE W/O DYE: CPT

## 2021-10-11 LAB — HLA-B27 QL NAA+PROBE: NEGATIVE

## 2021-10-18 DIAGNOSIS — J30.2 SEASONAL ALLERGIES: ICD-10-CM

## 2021-10-18 RX ORDER — FLUTICASONE PROPIONATE 50 MCG
2 SPRAY, SUSPENSION (ML) NASAL DAILY
Qty: 16 G | Refills: 2 | Status: SHIPPED | OUTPATIENT
Start: 2021-10-18 | End: 2022-04-13

## 2021-12-03 ENCOUNTER — OFFICE VISIT (OUTPATIENT)
Dept: FAMILY MEDICINE CLINIC | Facility: CLINIC | Age: 46
End: 2021-12-03

## 2021-12-03 VITALS
OXYGEN SATURATION: 97 % | BODY MASS INDEX: 20.52 KG/M2 | WEIGHT: 151.5 LBS | SYSTOLIC BLOOD PRESSURE: 120 MMHG | HEIGHT: 72 IN | DIASTOLIC BLOOD PRESSURE: 78 MMHG | HEART RATE: 109 BPM

## 2021-12-03 DIAGNOSIS — J30.2 SEASONAL ALLERGIES: ICD-10-CM

## 2021-12-03 DIAGNOSIS — I10 PRIMARY HYPERTENSION: ICD-10-CM

## 2021-12-03 DIAGNOSIS — R79.89 LOW VITAMIN D LEVEL: Primary | ICD-10-CM

## 2021-12-03 PROCEDURE — 99213 OFFICE O/P EST LOW 20 MIN: CPT | Performed by: CHIROPRACTOR

## 2021-12-03 RX ORDER — MULTIVIT-MIN/IRON/FOLIC ACID/K 18-600-40
2000 CAPSULE ORAL DAILY
Qty: 30 CAPSULE | Refills: 5 | Status: SHIPPED | OUTPATIENT
Start: 2021-12-03

## 2021-12-03 RX ORDER — OLOPATADINE HYDROCHLORIDE 1 MG/ML
1 SOLUTION/ DROPS OPHTHALMIC 2 TIMES DAILY
Qty: 5 ML | Refills: 5 | Status: SHIPPED | OUTPATIENT
Start: 2021-12-03

## 2021-12-03 RX ORDER — AMLODIPINE BESYLATE 2.5 MG/1
2.5 TABLET ORAL DAILY
Qty: 30 TABLET | Refills: 5 | Status: SHIPPED | OUTPATIENT
Start: 2021-12-03

## 2021-12-16 NOTE — PROGRESS NOTES
Family Medicine Residency  Harley Mcgowan MD    Subjective:     Nancy Rivera is a 46 y.o. female who presents for low vitamin D levels, hypertension, and seasonal allergies.  After her last visit with me for low back pain she did see her chiropractor which has helped some.  [facility] concerning her recent blood work.  Lab work performed on 11/23/2021 showed vitamin D level of 20.4.  The normal range by their standards is 30-96.  Liver AST levels are slightly elevated at 52.    The following portions of the patient's history were reviewed and updated as appropriate: allergies, current medications, past family history, past medical history, past social history, past surgical history and problem list.    Past Medical Hx:  Past Medical History:   Diagnosis Date   • Allergic    • Anemia    • Anxiety    • Back pain    • Low back pain 2016       Past Surgical Hx:  Past Surgical History:   Procedure Laterality Date   • BICEPS TENODESIS Left    • SHOULDER ACROMIOPLASTY Right        Current Meds:    Current Outpatient Medications:   •  acetaminophen (Tylenol) 325 MG tablet, Take 325 mg by mouth As Needed., Disp: , Rfl:   •  ALPRAZolam (XANAX) 0.25 MG tablet, Take 0.25 mg by mouth Daily As Needed., Disp: , Rfl:   •  amLODIPine (NORVASC) 2.5 MG tablet, Take 1 tablet by mouth Daily., Disp: 30 tablet, Rfl: 5  •  celecoxib (CeleBREX) 200 MG capsule, Take 1 capsule by mouth Daily., Disp: 30 capsule, Rfl: 2  •  cyclobenzaprine (FLEXERIL) 10 MG tablet, Take 1 tablet by mouth Daily., Disp: 60 tablet, Rfl: 0  •  fexofenadine (ALLEGRA) 180 MG tablet, Take 180 mg by mouth Daily., Disp: , Rfl:   •  fluticasone (Flonase Allergy Relief) 50 MCG/ACT nasal spray, 2 sprays into the nostril(s) as directed by provider Daily., Disp: 16 g, Rfl: 2  •  olopatadine (PATANOL) 0.1 % ophthalmic solution, Administer 1 drop to both eyes 2 (Two) Times a Day., Disp: 5 mL, Rfl: 5  •  Sodium  "Fluoride (PreviDent) 1.1 % gel, PreviDent 5000 Booster Plus 1.1 % dental paste, Disp: , Rfl:   •  triamcinolone (KENALOG) 0.1 % ointment, Apply  topically to the appropriate area as directed 2 (Two) Times a Day., Disp: 80 g, Rfl: 2  •  zolpidem (AMBIEN) 5 MG tablet, Take 5 mg by mouth At Night As Needed., Disp: , Rfl:   •  Cholecalciferol (Vitamin D) 50 MCG (2000 UT) capsule, Take 1 capsule by mouth Daily., Disp: 30 capsule, Rfl: 5    Allergies:  Allergies   Allergen Reactions   • Codeine Unknown - Low Severity       Family Hx:  History reviewed. No pertinent family history.     Social History:  Social History     Socioeconomic History   • Marital status:    Tobacco Use   • Smoking status: Current Every Day Smoker   • Smokeless tobacco: Never Used   Substance and Sexual Activity   • Alcohol use: Never   • Drug use: Never   • Sexual activity: Defer       Review of Systems  Review of Systems   Constitutional: Negative for appetite change and fatigue.   HENT: Negative for congestion and trouble swallowing.    Eyes: Positive for visual disturbance.        Reports decreased visual acuity at night.   Respiratory: Negative for choking and shortness of breath.    Cardiovascular: Negative for chest pain.   Gastrointestinal: Negative for abdominal pain and blood in stool.   Genitourinary: Negative for dysuria and hematuria.   Musculoskeletal: Positive for arthralgias, back pain and gait problem.   Skin: Negative for rash.   Neurological: Positive for headaches.       Objective:     /78   Pulse 109   Ht 182.9 cm (72\")   Wt 68.7 kg (151 lb 8 oz)   SpO2 97%   BMI 20.55 kg/m²   Physical Exam  Vitals reviewed.   Constitutional:       General: She is not in acute distress.     Appearance: She is not diaphoretic.   HENT:      Nose: No rhinorrhea.   Eyes:      General: No scleral icterus.  Neck:      Vascular: No carotid bruit.   Cardiovascular:      Rate and Rhythm: Normal rate and regular rhythm.      Pulses: " Normal pulses.   Pulmonary:      Effort: Pulmonary effort is normal.      Breath sounds: Normal breath sounds.   Abdominal:      General: Abdomen is flat.      Tenderness: There is no abdominal tenderness.   Musculoskeletal:      Right lower leg: No edema.      Left lower leg: No edema.   Skin:     Capillary Refill: Capillary refill takes less than 2 seconds.      Findings: No rash.   Neurological:      Mental Status: She is alert.   Psychiatric:         Mood and Affect: Mood normal.         Thought Content: Thought content normal.          Assessment/Plan:     Diagnoses and all orders for this visit:    1. Low vitamin D level (Primary)  - [facility] showing vitamin D level low at 20.4.  - Dr. GUERRA advised her to come here to get supplementation for low vitamin D  - patient is fair skinned and admits to not getting out very often.  - Importance of taking medication daily and not taking excessive doses discussed with patient.  -     Cholecalciferol (Vitamin D) 50 MCG (2000 UT) capsule; Take 1 capsule by mouth Daily.  Dispense: 30 capsule; Refill: 5    2. Primary hypertension  -     amLODIPine (NORVASC) 2.5 MG tablet; Take 1 tablet by mouth Daily.  Dispense: 30 tablet; Refill: 5    3. Seasonal allergies  -     olopatadine (PATANOL) 0.1 % ophthalmic solution; Administer 1 drop to both eyes 2 (Two) Times a Day.  Dispense: 5 mL; Refill: 5        · Rx changes: Cholecalciferol 50 MCG capsule once daily.  · Patient Education: Fat-soluble nature of vitamin D and possibility of toxicity.  · Compliance at present is estimated to be excellent.   · Efforts to improve compliance (if necessary) will be directed at increased exercise.      Follow-up:     Return in about 6 months (around 6/3/2022) for Recheck.    Preventative:  Health Maintenance   Topic Date Due   • COLORECTAL CANCER SCREENING  Never done   • ANNUAL PHYSICAL  Never done   • COVID-19 Vaccine (1) Never done   • Pneumococcal Vaccine 0-64 (1 of 2 -  PPSV23) Never done   • TDAP/TD VACCINES (1 - Tdap) Never done   • HEPATITIS C SCREENING  Never done   • PAP SMEAR  Never done   • INFLUENZA VACCINE  08/01/2021     BMI normal range at 20.55.    Alcohol use:  reports no history of alcohol use.  Nicotine status  reports that she has been smoking. She has never used smokeless tobacco.    Goals    None         RISK SCORE: 2        This document has been electronically signed by Harley Mcgowan MD on December 16, 2021 08:37 CST

## 2021-12-17 NOTE — PROGRESS NOTES
I have reviewed the notes, assessments, and/or procedures performed by *Harley Mcgowan MD  **during office visit. I concur with her/his documentation and assessment and plan for Nancy Rivera.          This document has been electronically signed by Daniel Do MD on December 17, 2021 10:41 CST

## 2022-04-13 DIAGNOSIS — J30.2 SEASONAL ALLERGIES: ICD-10-CM

## 2022-04-13 RX ORDER — FLUTICASONE PROPIONATE 50 MCG
SPRAY, SUSPENSION (ML) NASAL
Qty: 16 G | Refills: 2 | Status: SHIPPED | OUTPATIENT
Start: 2022-04-13

## 2022-09-23 ENCOUNTER — OFFICE VISIT (OUTPATIENT)
Dept: FAMILY MEDICINE CLINIC | Facility: CLINIC | Age: 47
End: 2022-09-23

## 2022-09-23 ENCOUNTER — LAB (OUTPATIENT)
Dept: LAB | Facility: HOSPITAL | Age: 47
End: 2022-09-23

## 2022-09-23 VITALS
SYSTOLIC BLOOD PRESSURE: 120 MMHG | HEART RATE: 91 BPM | WEIGHT: 154.5 LBS | OXYGEN SATURATION: 99 % | HEIGHT: 72 IN | BODY MASS INDEX: 20.93 KG/M2 | DIASTOLIC BLOOD PRESSURE: 78 MMHG

## 2022-09-23 DIAGNOSIS — R21 RASH AND NONSPECIFIC SKIN ERUPTION: Primary | ICD-10-CM

## 2022-09-23 DIAGNOSIS — F10.10 ALCOHOL ABUSE: ICD-10-CM

## 2022-09-23 DIAGNOSIS — R13.12 OROPHARYNGEAL DYSPHAGIA: ICD-10-CM

## 2022-09-23 PROCEDURE — 80061 LIPID PANEL: CPT

## 2022-09-23 PROCEDURE — 80053 COMPREHEN METABOLIC PANEL: CPT

## 2022-09-23 PROCEDURE — 99213 OFFICE O/P EST LOW 20 MIN: CPT | Performed by: CHIROPRACTOR

## 2022-09-23 PROCEDURE — 36415 COLL VENOUS BLD VENIPUNCTURE: CPT

## 2022-09-23 NOTE — PROGRESS NOTES
Family Medicine Residency  Harley Mcgowan MD    Subjective:     Nancy Rivera is a 47 y.o. female who presents for rash, dysphagia, and alcohol abuse.  She reports that she has had a rash on the back of the wrists and the right side of her neck that insidiously onset.  She thinks it might be due to a yeast infection.  She has not tried anything to get it to go away.  She also reports occasional dysphagia with some occasional vomiting due to inability to swallow food.  It sometimes gets stuck in her throat.  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  Today she endorses that for the last 6 or 7 months she has consumed 8-12 alcoholic drinks a day.  She is currently in counseling and is unsure why she is consuming excessive alcohol.    The following portions of the patient's history were reviewed and updated as appropriate: allergies, current medications, past family history, past medical history, past social history, past surgical history and problem list.    Past Medical Hx:  Past Medical History:   Diagnosis Date   • Allergic    • Anemia    • Anxiety    • Back pain    • Low back pain 2016       Past Surgical Hx:  Past Surgical History:   Procedure Laterality Date   • BICEPS TENODESIS Left    • SHOULDER ACROMIOPLASTY Right        Current Meds:    Current Outpatient Medications:   •  acetaminophen (TYLENOL) 325 MG tablet, Take 325 mg by mouth As Needed., Disp: , Rfl:   •  ALPRAZolam (XANAX) 0.25 MG tablet, Take 0.25 mg by mouth Daily As Needed., Disp: , Rfl:   •  amLODIPine (NORVASC) 2.5 MG tablet, Take 1 tablet by mouth Daily., Disp: 30 tablet, Rfl: 5  •  celecoxib (CeleBREX) 200 MG capsule, Take 1 capsule by mouth Daily., Disp: 30 capsule, Rfl: 2  •  Cholecalciferol (Vitamin D) 50 MCG (2000 UT) capsule, Take 1 capsule by mouth Daily., Disp: 30 capsule, Rfl: 5  •  cyclobenzaprine (FLEXERIL) 10 MG tablet, Take 1 tablet by mouth Daily., Disp: 60 tablet,  Rfl: 0  •  fexofenadine (ALLEGRA) 180 MG tablet, Take 180 mg by mouth Daily., Disp: , Rfl:   •  fluticasone (FLONASE) 50 MCG/ACT nasal spray, USE 2 SPRAYS IN EACH NOSTRIL DAILY AS DIRECTED BY PROVIDER, Disp: 16 g, Rfl: 2  •  olopatadine (PATANOL) 0.1 % ophthalmic solution, Administer 1 drop to both eyes 2 (Two) Times a Day., Disp: 5 mL, Rfl: 5  •  Sodium Fluoride 1.1 % gel, PreviDent 5000 Booster Plus 1.1 % dental paste, Disp: , Rfl:   •  triamcinolone (KENALOG) 0.1 % ointment, Apply  topically to the appropriate area as directed 2 (Two) Times a Day., Disp: 80 g, Rfl: 2  •  zolpidem (AMBIEN) 5 MG tablet, Take 5 mg by mouth At Night As Needed., Disp: , Rfl:     Allergies:  Allergies   Allergen Reactions   • Codeine Unknown - Low Severity       Family Hx:  History reviewed. No pertinent family history.     Social History:  Social History     Socioeconomic History   • Marital status:    Tobacco Use   • Smoking status: Current Every Day Smoker   • Smokeless tobacco: Never Used   Substance and Sexual Activity   • Alcohol use: Never   • Drug use: Never   • Sexual activity: Defer       Review of Systems  Review of Systems   Constitutional: Negative for fatigue.   HENT: Positive for trouble swallowing. Negative for congestion and rhinorrhea.    Eyes: Negative for visual disturbance.   Respiratory: Positive for cough. Negative for shortness of breath.    Cardiovascular: Negative for chest pain and palpitations.   Gastrointestinal: Positive for nausea and vomiting. Negative for abdominal pain and blood in stool.   Genitourinary: Negative for dysuria and hematuria.   Musculoskeletal: Positive for gait problem.        Uses cane in right hand   Skin: Positive for rash. Negative for color change.        Dorsum of arms and right side of neck.   Neurological: Negative for headaches.   Psychiatric/Behavioral: Positive for sleep disturbance. The patient is nervous/anxious.        Objective:     /78   Pulse 91   Ht  "182.9 cm (72\")   Wt 70.1 kg (154 lb 8 oz)   SpO2 99%   BMI 20.95 kg/m²   Physical Exam  Constitutional:       General: She is not in acute distress.     Appearance: She is not diaphoretic.   HENT:      Head: Atraumatic.      Right Ear: External ear normal.      Left Ear: External ear normal.      Nose: No rhinorrhea.      Mouth/Throat:      Mouth: Mucous membranes are moist.      Pharynx: Oropharynx is clear.   Eyes:      General: No scleral icterus.        Right eye: No discharge.         Left eye: No discharge.   Neck:      Vascular: No carotid bruit.      Comments: Palpation of neck does not show any apparent masses, thyroid gland is not enlarged and nontender.  Cardiovascular:      Rate and Rhythm: Normal rate and regular rhythm.      Pulses: Normal pulses.      Heart sounds: No murmur heard.  Pulmonary:      Effort: No respiratory distress.      Breath sounds: No wheezing.   Abdominal:      General: Bowel sounds are normal.      Palpations: There is no mass.      Tenderness: There is no abdominal tenderness.   Musculoskeletal:      Cervical back: No tenderness.      Right lower leg: No edema.      Left lower leg: No edema.   Skin:     Capillary Refill: Capillary refill takes less than 2 seconds.      Findings: Rash present.      Comments: Nonblanching, poorly demarcated, mildly erythematous rash dorsum of both arms just proximal to the wrist.  Similar rash right side of lower neck.   Neurological:      Mental Status: She is alert. Mental status is at baseline.   Psychiatric:         Mood and Affect: Mood normal.         Thought Content: Thought content normal.          Assessment/Plan:     Diagnoses and all orders for this visit:    1. Rash and nonspecific skin eruption (Primary)        - Patient reports recent onset of rash on the back of both lower arms and right side of neck.        - On examination rash is nonerythematous, nonblanching, and poorly demarcated.  Lightly erythematous.        - She denies " recent change in any food or personal care products.        - Discussed treatment with OTC hydrocortisone cream.    2. Alcohol abuse  - Patient endorses consuming 8-12 alcoholic beverages on a daily basis.  She has done so since March of this year.  - She is unsure why she is increased her consumption of alcohol and while she is in counseling they have been unable to ascertain reason.  - She does understand that this is excessive.  - She is worried about her liver and since those labs have not been done in almost 2 years we will repeat those today.  -     Comprehensive metabolic panel; Future  -     Lipid Panel; Future    3. 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  - Reports recent onset of some dysphagia with sensation of food occasionally getting stuck in his throat resulting in vomiting.  - Comes in today with a really bad which is an electronic device present on left wrist which she says does help somewhat.  - Given history of exposure to toxic chemicals some concern for cancer/abnormal tissue in throat was discussed today will refer to ENT for evaluation with scope.  -     Ambulatory Referral to ENT (Otolaryngology)       Follow-up:     Return in about 3 months (around 12/23/2022) for Recheck.    Preventative:  Health Maintenance   Topic Date Due   • COLORECTAL CANCER SCREENING  Never done   • COVID-19 Vaccine (1) Never done   • ANNUAL PHYSICAL  Never done   • Pneumococcal Vaccine 0-64 (1 - PCV) Never done   • TDAP/TD VACCINES (1 - Tdap) Never done   • HEPATITIS C SCREENING  Never done   • PAP SMEAR  Never done   • INFLUENZA VACCINE  10/01/2022           This document has been electronically signed by Harley Mcgowan MD on September 23, 2022 11:50 CDT

## 2022-09-24 LAB
ALBUMIN SERPL-MCNC: 4.9 G/DL (ref 3.5–5.2)
ALBUMIN/GLOB SERPL: 2.5 G/DL
ALP SERPL-CCNC: 54 U/L (ref 39–117)
ALT SERPL W P-5'-P-CCNC: 133 U/L (ref 1–33)
ANION GAP SERPL CALCULATED.3IONS-SCNC: 13.5 MMOL/L (ref 5–15)
AST SERPL-CCNC: 245 U/L (ref 1–32)
BILIRUB SERPL-MCNC: 0.9 MG/DL (ref 0–1.2)
BUN SERPL-MCNC: 6 MG/DL (ref 6–20)
BUN/CREAT SERPL: 10.7 (ref 7–25)
CALCIUM SPEC-SCNC: 9.3 MG/DL (ref 8.6–10.5)
CHLORIDE SERPL-SCNC: 97 MMOL/L (ref 98–107)
CHOLEST SERPL-MCNC: 250 MG/DL (ref 0–200)
CO2 SERPL-SCNC: 26.5 MMOL/L (ref 22–29)
CREAT SERPL-MCNC: 0.56 MG/DL (ref 0.57–1)
EGFRCR SERPLBLD CKD-EPI 2021: 113.4 ML/MIN/1.73
GLOBULIN UR ELPH-MCNC: 2 GM/DL
GLUCOSE SERPL-MCNC: 128 MG/DL (ref 65–99)
HDLC SERPL-MCNC: 121 MG/DL (ref 40–60)
LDLC SERPL CALC-MCNC: 118 MG/DL (ref 0–100)
LDLC/HDLC SERPL: 0.95 {RATIO}
POTASSIUM SERPL-SCNC: 3.4 MMOL/L (ref 3.5–5.2)
PROT SERPL-MCNC: 6.9 G/DL (ref 6–8.5)
SODIUM SERPL-SCNC: 137 MMOL/L (ref 136–145)
TRIGL SERPL-MCNC: 69 MG/DL (ref 0–150)
VLDLC SERPL-MCNC: 11 MG/DL (ref 5–40)

## 2022-09-27 ENCOUNTER — TELEPHONE (OUTPATIENT)
Dept: FAMILY MEDICINE CLINIC | Facility: CLINIC | Age: 47
End: 2022-09-27

## 2022-09-27 NOTE — TELEPHONE ENCOUNTER
Called patient to go over her recent laboratory testing with her.  No answer, left message that I would call back later.    This document has been electronically signed by Harley Mcgowan MD on September 27, 2022 14:46 CDT

## 2022-09-28 ENCOUNTER — TELEPHONE (OUTPATIENT)
Dept: FAMILY MEDICINE CLINIC | Facility: CLINIC | Age: 47
End: 2022-09-28

## 2022-09-28 NOTE — TELEPHONE ENCOUNTER
Patient has left a v/mail stating Dr Mcgowan left her an urgent message to return his call.  Her # is [phone].  DANIEL Guerrero

## 2022-09-30 ENCOUNTER — OFFICE VISIT (OUTPATIENT)
Dept: OTOLARYNGOLOGY | Facility: CLINIC | Age: 47
End: 2022-09-30

## 2022-09-30 VITALS — BODY MASS INDEX: 21.4 KG/M2 | HEIGHT: 72 IN | OXYGEN SATURATION: 99 % | HEART RATE: 100 BPM | WEIGHT: 158 LBS

## 2022-09-30 DIAGNOSIS — R09.89 GLOBUS PHARYNGEUS: ICD-10-CM

## 2022-09-30 DIAGNOSIS — J34.89 DRY NOSE: ICD-10-CM

## 2022-09-30 DIAGNOSIS — K21.9 LPRD (LARYNGOPHARYNGEAL REFLUX DISEASE): Primary | ICD-10-CM

## 2022-09-30 PROCEDURE — 31575 DIAGNOSTIC LARYNGOSCOPY: CPT | Performed by: OTOLARYNGOLOGY

## 2022-09-30 PROCEDURE — 99204 OFFICE O/P NEW MOD 45 MIN: CPT | Performed by: OTOLARYNGOLOGY

## 2022-09-30 RX ORDER — FAMOTIDINE 20 MG/1
40 TABLET, FILM COATED ORAL NIGHTLY
Qty: 180 TABLET | Refills: 0 | Status: SHIPPED | OUTPATIENT
Start: 2022-09-30 | End: 2022-12-29

## 2022-09-30 RX ORDER — NICOTINE POLACRILEX 4 MG/1
40 GUM, CHEWING ORAL DAILY
Qty: 180 EACH | Refills: 0 | Status: SHIPPED | OUTPATIENT
Start: 2022-09-30 | End: 2022-12-29

## 2022-09-30 NOTE — PROGRESS NOTES
Chief complaint: Dysphagia    Assessment and Plan:  47F with PMH including EtOH use now with globus and dysphagia, FFL demonstrates mild diffuse irritation and cobblestoning consistent with LPRD, no masses or lesions    -for nasal dryness recommend adding Vaseline or lanolin ointment a pea-sized amount to anterior nose bilaterally nightly  -Rx Famotidine 40mg qhs and Omeprazole 40mg qDay at least 30 min prior to food or drink  -We did discuss lifestyle and diet modifications to aid with her reflux disease, I encouraged her to pursue alcohol treatment to decrease the risks of both head and neck cancers and the severity of her reflux disease  -Follow-up in 6 weeks to recheck    History of present illness:    Ms. Bob is a 47F with PMH including EtOH abuse (8-10 drinks per day) [facility] now with persistent globus sensation, throat clearing, and feeling like pills are getting stuck followed by vomiting several times weekly for the last several months.  She denies a foul taste in the mouth outside of recent emesis.  She denies voice changes or rest of breath.  She states that she constantly chokes on her saliva but does not have any coughing, choking, or gagging when she is drinking or eating.  She feels like when she swallows pills especially they get stuck and points to the midline thyroid cartilage as location.  She denies any olga heartburn or indigestion.  She does drink caffeinated beverages daily, endorses 8-10 alcoholic drinks daily, and consumes a significant amount of spicy foods such as wasabi.  She has not had any unanticipated weight changes no ear pain, no sore throat.  No other acute ENT concerns today.    Vital Signs   Vitals:    09/30/22 1024   Pulse: 100   SpO2: 99%     Physical Exam:  General: NAD, awake and alert  Head: normocephalic, atraumatic  Eyes: EOMI, sclerae white, conjunctivae pink  Ears: pinnae intact without masses or lesions   Right: TM intact without  injection or effusion   Left: TM intact without injection or effusion  Nose: external straight without deformity   Mucosa pink, not edematous. No polyps seen. No purulence. Very dry   Septum: mild left deviation   Turbinates: not hypertrophied  OC/OP: mucosa dry and pink, no masses or lesions, tongue is midline and mobile. Tonsils 1+ without exudate. Uvula single and elevates symmetrically.  Neck: supple, no masses or lesions. Thyroid without palpable masses.  Neuro: CN II - XII grossly intact, no focal deficits    Procedure: Flexible Fiberoptic Laryngoscopy  Indications: dysphagia, globus  Anesthesia: Local  Surgeon: Sarah Cruz MD  EBL: none  Complications: none    Description:  Informed consent was verbally obtained. The nose was topically decongested with Neosynephrine and anesthetized with 4% lidocaine. The flexible endoscope was placed into the nasal cavity, and advanced to visualize the nasopharynx, oropharynx, hypopharynx, and larynx. The endoscope was removed at the conclusion of the exam.  Findings are as below:    Nasal cavity: Normal septum, normal turbinates, no evidence of polyps  Nasopharynx: Normal adenoid area and normal Eustachian tubes. No masses or lesions.  Oropharynx/Hypopharynx: BOT without masses, lesions, or edema. No masses or lesions of hypopharynx. No pooling of secretions. Posterior pharyngeal wall with without erythema, edema. There is mild diffuse irritation and cobblestoning consistent with LPRD.  Larynx: Vallecula is normal.  Epiglottis thin and crisp. Supraglottis without masses, lesions, or edema.  Visualized subglottis without masses or lesions.  Interarytenoid area without pachydermia or scarring. True vocal folds appropriately mobile with phonation bilaterally.  Mucosa normal. No mucus stranding.  No masses or lesions.    Results Review:  Most recent primary care physician visit from last week reviewed today as well as laboratory results ordered from that visit.  6 to 7-month  increase in alcohol intake daily was noted at that visit without a known etiology.  Laboratory results demonstrate elevated AST and ALT as well as elevated lipids and her blood work.  He did also have an elevated fasting blood glucose at that last laboratory encounter.  Dysphagia was noted at her most recent primary care visit characterized as food sometimes getting stuck and sometimes spitting food back up.  No recent swallow evaluation, speech therapy evaluation, or esophagram in the chart today.    Review of Systems:  Positive ROS items: Congestion, hearing loss, postnasal drip, sneezing, trouble swallowing, eye itching, visual disturbance, choking, vomiting, nausea, arthritis, back pain, neck pain, neck stiffness, skin color changes, dizziness, numbness, and weakness.  Otherwise, a 14 system ROS is negative except as pertinent positives are mentioned above.    Histories:  Allergies   Allergen Reactions   • Codeine Unknown - Low Severity       Prior to Admission medications    Medication Sig Start Date End Date Taking? Authorizing Provider   acetaminophen (TYLENOL) 325 MG tablet Take 325 mg by mouth As Needed.    Bessy Estrada MD   ALPRAZolam (XANAX) 0.25 MG tablet Take 0.25 mg by mouth Daily As Needed. 8/2/21   Bessy Estrada MD   amLODIPine (NORVASC) 2.5 MG tablet Take 1 tablet by mouth Daily. 12/3/21   Harley Mcgowan MD   celecoxib (CeleBREX) 200 MG capsule Take 1 capsule by mouth Daily. 9/16/21   Harley Mcgowan MD   Cholecalciferol (Vitamin D) 50 MCG (2000 UT) capsule Take 1 capsule by mouth Daily. 12/3/21   Harley Mcgowan MD   cyclobenzaprine (FLEXERIL) 10 MG tablet Take 1 tablet by mouth Daily. 6/21/21   Luis Stevenson MD   fexofenadine (ALLEGRA) 180 MG tablet Take 180 mg by mouth Daily.    Bessy Estrada MD   fluticasone (FLONASE) 50 MCG/ACT nasal spray USE 2 SPRAYS IN EACH NOSTRIL DAILY AS DIRECTED BY PROVIDER 4/13/22   Harley Mcgowan MD   olopatadine (PATANOL) 0.1 %  ophthalmic solution Administer 1 drop to both eyes 2 (Two) Times a Day. 12/3/21   Harley Mcgowan MD   Sodium Fluoride 1.1 % gel PreviDent 5000 Booster Plus 1.1 % dental paste    ProviderBessy MD   triamcinolone (KENALOG) 0.1 % ointment Apply  topically to the appropriate area as directed 2 (Two) Times a Day. 6/21/21   Luis Stevenson MD   zolpidem (AMBIEN) 5 MG tablet Take 5 mg by mouth At Night As Needed. 8/2/21   Provider, MD Bessy       Past Medical History:   Diagnosis Date   • Allergic    • Anemia    • Anxiety    • Back pain    • Low back pain 2016       Past Surgical History:   Procedure Laterality Date   • BICEPS TENODESIS Left    • SHOULDER ACROMIOPLASTY Right        Social History     Socioeconomic History   • Marital status:    Tobacco Use   • Smoking status: Current Every Day Smoker   • Smokeless tobacco: Never Used   Substance and Sexual Activity   • Alcohol use: Never   • Drug use: Never   • Sexual activity: Defer       No family history on file.          This document has been electronically signed by Sarah Cruz MD on September 30, 2022 10:19 CDT

## 2023-10-18 NOTE — PROGRESS NOTES
Subjective   Nancy Rivera is a 45 y.o. female who presents for initial evaluation  for establish care visit. Pt has a past medical history of bicep tendon tear for which she has had 2 surgeries in the past. Also diagnosed with chrondal separation at the knee joint. Pt was medically discharged from the [employer] secondary to these conditions. [facility] and would like to establish with a different PCP. Se has had imaging performed on both locations >1 year ago. Also currently seeing OB/GYN in [place] for a scheduled hysterectomy in 1 month due to heavy frequent menstrual periods resulting in anemia and need of iron infusions.       Past Medical Hx:  Past Medical History:   Diagnosis Date   • Allergic    • Anemia    • Anxiety    • Back pain        Past Surgical Hx:  Past Surgical History:   Procedure Laterality Date   • BICEPS TENODESIS Left    • SHOULDER ACROMIOPLASTY Right        Health Maintenance:  Health Maintenance   Topic Date Due   • COLONOSCOPY  1975   • ANNUAL PHYSICAL  06/16/1978   • Pneumococcal Vaccine 0-64 (1 of 1 - PPSV23) 06/16/1981   • TDAP/TD VACCINES (1 - Tdap) 06/16/1994   • INFLUENZA VACCINE  08/01/2020   • HEPATITIS C SCREENING  01/22/2021   • PAP SMEAR  01/22/2021   • MENINGOCOCCAL VACCINE  Aged Out       Current Meds:    Current Outpatient Medications:   •  acetaminophen (Tylenol) 325 MG tablet, Take 325 mg by mouth As Needed., Disp: , Rfl:   •  ALPRAZolam (Xanax) 0.25 MG tablet, Take 0.5 mg by mouth As Needed., Disp: , Rfl:   •  amLODIPine (NORVASC) 2.5 MG tablet, Take 2.5 mg by mouth As Needed., Disp: , Rfl:   •  celecoxib (CeleBREX) 200 MG capsule, Take 200 mg by mouth Daily., Disp: , Rfl:   •  cyclobenzaprine (FLEXERIL) 10 MG tablet, Take 10 mg by mouth Daily., Disp: , Rfl:   •  fluticasone (Flonase Allergy Relief) 50 MCG/ACT nasal spray, , Disp: , Rfl:   •  olopatadine (PATANOL) 0.1 % ophthalmic solution, olopatadine  "0.1 % eye drops, Disp: , Rfl:   •  Sodium Fluoride (PreviDent) 1.1 % gel, PreviDent 5000 Booster Plus 1.1 % dental paste, Disp: , Rfl:   •  triamcinolone (KENALOG) 0.1 % ointment, triamcinolone acetonide 0.1 % topical ointment, Disp: , Rfl:   •  zolpidem (Ambien) 5 MG tablet, Take 5 mg by mouth Daily., Disp: , Rfl:     Allergies:  Codeine    Family Hx:  History reviewed. No pertinent family history.     Social History:  Social History     Socioeconomic History   • Marital status:      Spouse name: Not on file   • Number of children: Not on file   • Years of education: Not on file   • Highest education level: Not on file   Tobacco Use   • Smoking status: Current Every Day Smoker   • Smokeless tobacco: Never Used   Substance and Sexual Activity   • Alcohol use: Never     Frequency: Never   • Drug use: Never   • Sexual activity: Defer       Review of Systems  Review of Systems   Constitutional: Negative for activity change, chills, diaphoresis and fever.   HENT: Negative for dental problem, drooling, ear discharge, ear pain, facial swelling, mouth sores, nosebleeds, rhinorrhea, sinus pressure, sinus pain, sneezing and sore throat.    Eyes: Negative for pain, discharge and visual disturbance.   Respiratory: Negative for apnea, cough, shortness of breath, wheezing and stridor.    Cardiovascular: Negative for chest pain, palpitations and leg swelling.   Gastrointestinal: Negative for abdominal distention, abdominal pain, constipation, diarrhea, nausea and vomiting.   Genitourinary: Negative for dysuria, frequency and urgency.   Musculoskeletal: Positive for arthralgias and gait problem. Negative for back pain.   Skin: Negative for rash and wound.   Neurological: Negative for dizziness, facial asymmetry, light-headedness and headaches.   Psychiatric/Behavioral: Negative for agitation and decreased concentration. The patient is not nervous/anxious.             Objective:     /88   Pulse 83   Ht 182.9 cm (72\") "   Wt 69.5 kg (153 lb 3.2 oz)   SpO2 96%   BMI 20.78 kg/m²       Physical Exam  Constitutional:       Appearance: She is well-developed.   HENT:      Head: Normocephalic and atraumatic.      Right Ear: External ear normal.      Left Ear: External ear normal.      Nose: Nose normal.      Mouth/Throat:      Pharynx: No oropharyngeal exudate.   Eyes:      General: No scleral icterus.        Right eye: No discharge.         Left eye: No discharge.      Conjunctiva/sclera: Conjunctivae normal.      Pupils: Pupils are equal, round, and reactive to light.   Neck:      Musculoskeletal: Normal range of motion and neck supple.      Vascular: No JVD.   Cardiovascular:      Rate and Rhythm: Normal rate and regular rhythm.      Heart sounds: Normal heart sounds. No murmur. No friction rub. No gallop.    Pulmonary:      Effort: Pulmonary effort is normal. No respiratory distress.      Breath sounds: Normal breath sounds. No stridor. No wheezing or rales.   Abdominal:      General: Bowel sounds are normal. There is no distension.      Palpations: Abdomen is soft.      Tenderness: There is no abdominal tenderness.   Musculoskeletal: Normal range of motion.         General: Swelling (Left knee) and tenderness (with movement of left shoulder; reacing to shoulder height and above. With bending of elbow) present. No deformity.   Skin:     General: Skin is warm and dry.      Capillary Refill: Capillary refill takes less than 2 seconds.      Coloration: Skin is not pale.      Findings: No erythema or rash.   Neurological:      Mental Status: She is alert and oriented to person, place, and time.   Psychiatric:         Behavior: Behavior normal.         Assessment/Plan:     Diagnoses and all orders for this visit:    1. Healthcare maintenance (Primary)  -     CBC w AUTO Differential; Future  -     Comprehensive metabolic panel; Future  -     Lipid panel; Future  -     Sedimentation rate, automated; Future    2. Chronic left shoulder  pain  -     XR Shoulder 2+ View Left; Future    3. Chronic pain of left knee  -     Cancel: XR Knee 1 or 2 View Left; Future  -     XR knee 4+ vw left; Future    4. Raynaud's phenomenon without gangrene  -     Uric acid; Future  -     Rheumatoid factor; Future  -     C-reactive protein; Future     Will order baseline labs at this time, will check for any anemia that Pt has been diagnosed with previously and is scheduled to receive hysterectomy. Will order further imaging of left shoulder and knee as it has been >1 year and we do not have these records available. Will refer to Orthopedics for further evaluation and treatment.     Follow-up:     Return in about 1 month (around 2/22/2021).    Preventative:    Vaccines Recommended at this visit:   Influenza    Vaccines Received at this visit:  No Vaccines recommended today. Patient is up to date on all vaccines.     Screenings Recommended at this visit:  No Screenings offered today. Patient is up to date on all screenings at this time.     Screenings Ordered at this visit:  No screenings were offered today. Patient is up to date on all screenings.     Smoking Status:  Patient is current smoker. Patient is not interested in smoking cessation.    Alcohol Intake:  Patient does not drink    Patient's Body mass index is 20.78 kg/m². BMI is below normal parameters. Recommendations include: increase caloric intake.         RISK SCORE: 3          This document has been electronically signed by Luis Stevenson MD on January 22, 2021 15:08 CST             Dapsone Counseling: I discussed with the patient the risks of dapsone including but not limited to hemolytic anemia, agranulocytosis, rashes, methemoglobinemia, kidney failure, peripheral neuropathy, headaches, GI upset, and liver toxicity.  Patients who start dapsone require monitoring including baseline LFTs and weekly CBCs for the first month, then every month thereafter.  The patient verbalized understanding of the proper use and possible adverse effects of dapsone.  All of the patient's questions and concerns were addressed.